# Patient Record
Sex: MALE | Race: BLACK OR AFRICAN AMERICAN | NOT HISPANIC OR LATINO | URBAN - METROPOLITAN AREA
[De-identification: names, ages, dates, MRNs, and addresses within clinical notes are randomized per-mention and may not be internally consistent; named-entity substitution may affect disease eponyms.]

---

## 2021-04-07 ENCOUNTER — OFFICE VISIT (OUTPATIENT)
Dept: URBAN - METROPOLITAN AREA CLINIC 98 | Facility: CLINIC | Age: 76
End: 2021-04-07

## 2022-06-14 ENCOUNTER — OFFICE VISIT (OUTPATIENT)
Dept: URBAN - METROPOLITAN AREA CLINIC 98 | Facility: CLINIC | Age: 77
End: 2022-06-14
Payer: COMMERCIAL

## 2022-06-14 VITALS
BODY MASS INDEX: 24.28 KG/M2 | SYSTOLIC BLOOD PRESSURE: 145 MMHG | HEIGHT: 70 IN | TEMPERATURE: 97.4 F | WEIGHT: 169.6 LBS | DIASTOLIC BLOOD PRESSURE: 92 MMHG | HEART RATE: 86 BPM

## 2022-06-14 DIAGNOSIS — K21.9 GERD WITHOUT ESOPHAGITIS: ICD-10-CM

## 2022-06-14 DIAGNOSIS — Z12.11 COLON CANCER SCREENING: ICD-10-CM

## 2022-06-14 PROCEDURE — 99214 OFFICE O/P EST MOD 30 MIN: CPT | Performed by: INTERNAL MEDICINE

## 2022-06-14 RX ORDER — FINASTERIDE 5 MG/1
TAKE 1 TABLET (5 MG) BY ORAL ROUTE ONCE DAILY TABLET, FILM COATED ORAL 1
Qty: 0 | Refills: 0 | Status: ACTIVE | COMMUNITY
Start: 1900-01-01

## 2022-06-14 RX ORDER — DILTIAZEM HYDROCHLORIDE 240 MG/1
CAPSULE, EXTENDED RELEASE ORAL
Qty: 0 | Refills: 0 | Status: ACTIVE | COMMUNITY
Start: 1900-01-01

## 2022-06-14 RX ORDER — LEVOTHYROXINE SODIUM 25 UG/1
TABLET ORAL
Qty: 0 | Refills: 0 | Status: ACTIVE | COMMUNITY
Start: 1900-01-01

## 2022-06-14 RX ORDER — POLYETHYLENE GLYCOL 3350, SODIUM SULFATE ANHYDROUS, SODIUM BICARBONATE, SODIUM CHLORIDE, POTASSIUM CHLORIDE 236; 22.74; 6.74; 5.86; 2.97 G/4L; G/4L; G/4L; G/4L; G/4L
AS DIRECTED POWDER, FOR SOLUTION ORAL
Qty: 1 KIT | Refills: 0 | OUTPATIENT
Start: 2022-06-14 | End: 2022-06-15

## 2022-06-14 RX ORDER — METOPROLOL SUCCINATE 25 MG/1
TABLET, EXTENDED RELEASE ORAL
Qty: 0 | Refills: 0 | Status: ACTIVE | COMMUNITY
Start: 1900-01-01

## 2022-06-14 RX ORDER — LAMOTRIGINE 150 MG/1
TABLET ORAL
Qty: 0 | Refills: 0 | Status: ACTIVE | COMMUNITY
Start: 1900-01-01

## 2022-06-14 RX ORDER — ROSUVASTATIN CALCIUM 20 MG/1
TABLET, FILM COATED ORAL
Qty: 0 | Refills: 0 | Status: ACTIVE | COMMUNITY
Start: 1900-01-01

## 2022-06-14 NOTE — HPI-TODAY'S VISIT:
Patient referred by Julian Tariq MD for surveillance colonoscopy for PHx colon polyps, and chronic GERD. Copy of this consult sent to Dr. Tariq. 76 yo pt w chronic GERD well controlled w diet and on-demand PPI. Has no alarm nor extraesophageal KARIN sxs. No anorexia or weight loss. He's due for CRC screening. No change in bowel pattern and no bleeding. Since prior OV has been Dx'd w A. fib on ASA-81; seen by Cardiology 3 mos ago: normal evaluation, Normal Echo and XTT.  Currently, wo cardiorespiratory sxs. Weight stable and no anorexia. Hd mild COVID-19 2 yrs ago and has received 2 doses of COVID-19 vaccine + booster.

## 2022-06-20 ENCOUNTER — DASHBOARD ENCOUNTERS (OUTPATIENT)
Age: 77
End: 2022-06-20

## 2022-07-02 PROBLEM — 266435005: Status: ACTIVE | Noted: 2022-06-14

## 2022-08-29 ENCOUNTER — OFFICE VISIT (OUTPATIENT)
Dept: URBAN - METROPOLITAN AREA SURGERY CENTER 18 | Facility: SURGERY CENTER | Age: 77
End: 2022-08-29

## 2022-10-11 ENCOUNTER — OFFICE VISIT (OUTPATIENT)
Dept: URBAN - METROPOLITAN AREA CLINIC 98 | Facility: CLINIC | Age: 77
End: 2022-10-11